# Patient Record
Sex: MALE | Race: WHITE
[De-identification: names, ages, dates, MRNs, and addresses within clinical notes are randomized per-mention and may not be internally consistent; named-entity substitution may affect disease eponyms.]

---

## 2018-06-19 ENCOUNTER — HOSPITAL ENCOUNTER (EMERGENCY)
Dept: HOSPITAL 62 - ER | Age: 43
Discharge: LEFT BEFORE DISCHARGE | End: 2018-06-19
Payer: OTHER GOVERNMENT

## 2018-06-19 VITALS — DIASTOLIC BLOOD PRESSURE: 81 MMHG | SYSTOLIC BLOOD PRESSURE: 140 MMHG

## 2018-06-19 DIAGNOSIS — J39.0: Primary | ICD-10-CM

## 2018-06-19 DIAGNOSIS — F17.200: ICD-10-CM

## 2018-06-19 DIAGNOSIS — I10: ICD-10-CM

## 2018-06-19 DIAGNOSIS — E78.00: ICD-10-CM

## 2018-06-19 LAB
ADD MANUAL DIFF: NO
ALBUMIN SERPL-MCNC: 3.9 G/DL (ref 3.5–5)
ALP SERPL-CCNC: 81 U/L (ref 38–126)
ALT SERPL-CCNC: 27 U/L (ref 21–72)
ANION GAP SERPL CALC-SCNC: 12 MMOL/L (ref 5–19)
AST SERPL-CCNC: 22 U/L (ref 17–59)
BASOPHILS # BLD AUTO: 0.1 10^3/UL (ref 0–0.2)
BASOPHILS NFR BLD AUTO: 0.7 % (ref 0–2)
BILIRUB DIRECT SERPL-MCNC: 0.4 MG/DL (ref 0–0.4)
BILIRUB SERPL-MCNC: 0.4 MG/DL (ref 0.2–1.3)
BUN SERPL-MCNC: 12 MG/DL (ref 7–20)
CALCIUM: 9.3 MG/DL (ref 8.4–10.2)
CHLORIDE SERPL-SCNC: 107 MMOL/L (ref 98–107)
CO2 SERPL-SCNC: 25 MMOL/L (ref 22–30)
EOSINOPHIL # BLD AUTO: 0.2 10^3/UL (ref 0–0.6)
EOSINOPHIL NFR BLD AUTO: 1.9 % (ref 0–6)
ERYTHROCYTE [DISTWIDTH] IN BLOOD BY AUTOMATED COUNT: 12.5 % (ref 11.5–14)
GLUCOSE SERPL-MCNC: 87 MG/DL (ref 75–110)
HCT VFR BLD CALC: 39.8 % (ref 37.9–51)
HGB BLD-MCNC: 14 G/DL (ref 13.5–17)
LYMPHOCYTES # BLD AUTO: 1.9 10^3/UL (ref 0.5–4.7)
LYMPHOCYTES NFR BLD AUTO: 21.9 % (ref 13–45)
MCH RBC QN AUTO: 32.1 PG (ref 27–33.4)
MCHC RBC AUTO-ENTMCNC: 35.1 G/DL (ref 32–36)
MCV RBC AUTO: 92 FL (ref 80–97)
MONOCYTES # BLD AUTO: 0.6 10^3/UL (ref 0.1–1.4)
MONOCYTES NFR BLD AUTO: 7.2 % (ref 3–13)
NEUTROPHILS # BLD AUTO: 6 10^3/UL (ref 1.7–8.2)
NEUTS SEG NFR BLD AUTO: 68.3 % (ref 42–78)
PLATELET # BLD: 277 10^3/UL (ref 150–450)
POTASSIUM SERPL-SCNC: 4.2 MMOL/L (ref 3.6–5)
PROT SERPL-MCNC: 6.9 G/DL (ref 6.3–8.2)
RBC # BLD AUTO: 4.35 10^6/UL (ref 4.35–5.55)
SODIUM SERPL-SCNC: 144 MMOL/L (ref 137–145)
TOTAL CELLS COUNTED % (AUTO): 100 %
WBC # BLD AUTO: 8.8 10^3/UL (ref 4–10.5)

## 2018-06-19 PROCEDURE — 96374 THER/PROPH/DIAG INJ IV PUSH: CPT

## 2018-06-19 PROCEDURE — 99284 EMERGENCY DEPT VISIT MOD MDM: CPT

## 2018-06-19 PROCEDURE — 70491 CT SOFT TISSUE NECK W/DYE: CPT

## 2018-06-19 PROCEDURE — 85025 COMPLETE CBC W/AUTO DIFF WBC: CPT

## 2018-06-19 PROCEDURE — 87880 STREP A ASSAY W/OPTIC: CPT

## 2018-06-19 PROCEDURE — 36415 COLL VENOUS BLD VENIPUNCTURE: CPT

## 2018-06-19 PROCEDURE — 87070 CULTURE OTHR SPECIMN AEROBIC: CPT

## 2018-06-19 PROCEDURE — 80053 COMPREHEN METABOLIC PANEL: CPT

## 2018-06-19 NOTE — ER DOCUMENT REPORT
ED ENT





- General


Chief Complaint: Sore Throat


Stated Complaint: SWOLLEN THROAT


Time Seen by Provider: 06/19/18 14:40


Mode of Arrival: Ambulatory


TRAVEL OUTSIDE OF THE [place] IN LAST 30 DAYS: No





- HPI


Patient complains to provider of: Throat problem


Onset: Yesterday


Notes: 





Patient is here with complaints of sore throat and throat swelling.  He states 

that he started to have a sore throat after eating [employer] yesterday for 

lunch.  As time progressed he felt like his throat was getting swollen and he 

was felt like he was having some difficulty breathing and swallowing.  States 

that last night when he would lay down, he felt like he was choking.  He states 

that he states feels somewhat better today but continues to have right sided 

throat pain.  [facility] to [facility] emergency 

department for evaluation.  He denies any difficulty breathing or swelling 

currently.  No fever.  No nausea, vomiting, diarrhea.  No chest pain or 

shortness of breath.  No rash.  No neck swelling.  No chest pain or shortness 

of breath.  No headache or blurred vision.  Pain is worse with swallowing, 

nothing seems to make it better.





- Related Data


Allergies/Adverse Reactions: 


 





No Known Allergies Allergy (Verified 06/19/18 14:14)


 











Past Medical History





- Social History


Smoking Status: Current Every Day Smoker


Chew tobacco use (# tins/day): No


Frequency of alcohol use: Social


Drug Abuse: Marijuana, Other


Family History: Reviewed & Not Pertinent


Patient has suicidal ideation: No


Patient has homicidal ideation: No





- Past Medical History


Cardiac Medical History: Reports: Hx Hypercholesterolemia, Hx Hypertension


Renal/ Medical History: Denies: Hx Peritoneal Dialysis


Past Surgical History: Reports: Hx Orthopedic Surgery - spinal surgery





Review of Systems





- Review of Systems


-: Yes All other systems reviewed and negative





Physical Exam





- Vital signs


Vitals: 


 











Temp Pulse Resp BP Pulse Ox


 


 98.6 F   69   16   138/83 H  98 


 


 06/19/18 14:18  06/19/18 14:18  06/19/18 14:18  06/19/18 14:18  06/19/18 14:18














- Notes


Notes: 





GENERAL: alert, cooperative, nontoxic, no distress.


HEAD: normocephalic, atraumatic


EYES: conjunctiva pink without discharge, no external redness or swelling.


EARS: no external swelling, no external redness, no mastoid redness, swelling, 

tenderness.  Ear canals are clear without swelling or drainage.  TMs pearly gray

, no redness, no bulging, normal landmarks, no perforation.


NOSE: atraumatic, no external swelling. clear rhinorrhea noted.


MOUTH/THROAT: mucous membranes moist and pink.  Mild erythema to the posterior 

pharynx.  The uvula appears to be slightly enlarged.  No exudate.  No 

unilateral tonsillar swelling.  No trismus or drooling.  Uvula is midline.  

Voice is normal.


NECK: soft, supple, full range of motion, no meningismus.


CHEST: no distress, lungs clear and equal throughout.  No wheezing, rales, 

rhonchi.  No stridor.


CARDIAC: regular rate and rhythm, no murmur, normal capillary refill, normal 

pulses.  No peripheral edema noted.


BACK: full range of motion, no CVA tenderness.


EXTREMITIES: full range of motion of all extremities.  No redness, no swelling.


NEURO: alert and oriented A&O3, no focal deficits, full range of motion of all 

extremities.


PYSCH: appropriate mood, affect.  Patient is cooperative.


SKIN: pink, warm, dry, no rash.





Course





- Re-evaluation


Re-evalutation: 





06/19/18 16:59


Patient noted to have possible retropharyngeal abscess on CT of the neck.  I 

have discussed the findings with the patient.  I do not currently have an ENT 

on call at this time, therefore I will likely need to transfer this patient.  I 

have reached out to the [facility] transfer center and I am currently 

awaiting return phone call.  The patient remaines stable at this time.


06/19/18 17:29


I discussed the case with Dr. Ramakrishna Davis, ENT at [facility].  He has 

accepted the patient as a transfer from ER to ER to evaluate the patient.  We 

will make a copy of his CT results and he will be sent there with all 

information needed.  Because the patient has potential airway involvement, I 

believe that the patient should be transported by EMS.  The patient states that 

he does not want to be transported by EMS.  I had a long discussion with the 

patient regarding the risks and benefits of transport by medical professionals.

  He understands that he could have potential worsening swelling, airway 

compromise, and even potentially death if he takes himself to the ER.  He 

verbalized an understanding of this and has a sound mind make this decision.  

He has family at the bedside that is agreeable to this plan as well.  Patient 

will be signing out AGAINST MEDICAL ADVICE from this emergency department but 

will drive directly to [facility] emergency department to be evaluated by 

ENT.





The patient and/or family have decided to leave against medical advice.  The 

patient and/or family are of sound mind to make this decision.  The risks of 

leaving were discussed with the patient and/or family who verbalized an 

understanding of these risks.  The possibility of worsening condition, chance 

of increased morbidity, disability, mortality, and even death were discussed.  

The patient and/or family still choose to leave against medical advice.  Strict 

return instructions were given.  They were also instructed to return to the 

emergency department for any concerns not outlined in the return instructions.





- Vital Signs


Vital signs: 


 











Temp Pulse Resp BP Pulse Ox


 


 98.6 F   69   16   138/83 H  98 


 


 06/19/18 14:18  06/19/18 14:18  06/19/18 14:18  06/19/18 14:18  06/19/18 14:18














- Laboratory


Result Diagrams: 


 06/19/18 15:15





 06/19/18 15:15





- Diagnostic Test


Radiology reviewed: Image reviewed, Reports reviewed - Prevertebral fluid 

collection from C1-C5 in the midline measuring 7 cm x 3 cm x 1.6 cm.  

Encasement of the left piriform recess in the supraglottic larynx.





Discharge





- Discharge


Clinical Impression: 


 Retropharyngeal abscess





Condition: Stable


Disposition: AGAINST MEDICAL ADVICE


Instructions:  Sore Throat ([facility])


Additional Instructions: 


Go directly to [facility] emergency department to be evaluated by Dr. Ramakrishna Davis, ENT. Do not eat or drink on your way to the ER.  Do not smoke on her 

way to the ER.  If you feel like you are having difficulty breathing, swallowing

, swelling or any further concerns on your way there, be sure to pull over and 

call 911.


Forms:  Elevated Blood Pressure, Smoking Cessation Education


Referrals: 


SALVADOR DAY MD [HONORARY] - Follow up as needed

## 2018-06-19 NOTE — RADIOLOGY REPORT (SQ)
EXAM DESCRIPTION:  CT SOFT TISSUE NECK WITH



COMPLETED DATE/TIME:  6/19/2018 4:18 pm



REASON FOR STUDY:  right neck pain, difficutly swallowing



COMPARISON:  None.



TECHNIQUE:  Post IV contrasted scanning from skull base through lung apices with review of bone, soft
 tissue and lung windows.  Reconstructed coronal and sagittal MPR images reviewed.  All images stored
 on PACS.

All CT scanners at this facility use dose modulation, iterative reconstruction, and/or weight based d
osing when appropriate to reduce radiation dose to as low as reasonably achievable (ALARA).

[facility]: Dose Right  [facility]: CareDose    [facility]: Dose Right    [facility]: Teradose 4D    [facility]: Smart Technologies



CONTRAST TYPE AND DOSE:  contrast/concentration: Isovue 370.00 mg/ml; Total Contrast Delivered: 75.0 
ml; Total Saline Delivered: 55.0 ml



RENAL FUNCTION:  Creatinine 0.6



RADIATION DOSE:  CT Rad equipment meets quality standard of care and radiation dose reduction techniq
ues were employed. CTDIvol: 17.1 mGy. DLP: 620 mGy-cm. .



LIMITATIONS:  None.



FINDINGS:  There is water density fluid in the prevertebral space, fluid pocket measures 7 cm cranioc
audad by 3 cm transverse by 1.6 cm AP.  Fluid is seen in the prevertebral soft tissues from the level
 of the anterior arch of C1 to the upper end of the fixation plate at C5.  This displaces the pharyng
eal soft tissues ventrally.  Findings are best shown on axial images 51 through 67, and sagittal imag
es 40 through 44.  Findings are worrisome for prevertebral space abscess or inflammation.  Findings w
graeme Mclean in the emergency room, 1615 hours 6/19/2018

SKULL BASE: Inferior brain parenchyma unremarkable.

MAJOR SALIVARY GLANDS: No solid or cystic masses.  No inflammatory changes.

LYMPHADENOPATHY: No adenopathy.

MUCOSAL MASSES OR ASYMMETRY: No mucosal masses or asymmetry.

LARYNX/CORDS: There is asymmetric effacement of the left piriform recess in the supraglottic larynx, 
best shown on axial images 66 through 69.  This could indicate local inflammation or infection.  Tumo
r could not entirely be excluded.

VASCULAR STRUCTURES: The major vessels are patent.

LUNG APICES: Clear.

BONES: Post discectomy with fusion hardware at C5-6 and C6-7.  There is incorporation of CT apparent 
bone graft.  No lucency around the hardware worrisome for loosening.

THYROID: Normal size.  No masses.

PARANASAL SINUSES: Clear.

OTHER: No other significant finding.



IMPRESSION:  Abnormal fluid in the prevertebral space from the C1 through upper C5 level.  This bulge
s the posterior pharyngeal wall ventrally.  This is abnormal, worrisome for prevertebral space infect
ion or inflammation.

Effacement of the left piriform recess in the supraglottic larynx.  Findings are worrisome for mucosa
l swelling.  Tumor could not be excluded.

These findings were discussed with the patient's provider in the emergency room as above.



TECHNICAL DOCUMENTATION:  JOB ID:  [id number]

Quality ID # 436: Final reports with documentation of one or more dose reduction techniques (e.g., Au
tomated exposure control, adjustment of the mA and/or kV according to patient size, use of iterative 
reconstruction technique)

 2011 [employer]- All Rights Reserved



Reading location - IP/workstation name: [facility]